# Patient Record
Sex: FEMALE | Race: WHITE | NOT HISPANIC OR LATINO | URBAN - METROPOLITAN AREA
[De-identification: names, ages, dates, MRNs, and addresses within clinical notes are randomized per-mention and may not be internally consistent; named-entity substitution may affect disease eponyms.]

---

## 2022-04-19 ENCOUNTER — OFFICE VISIT (OUTPATIENT)
Dept: URGENT CARE | Facility: CLINIC | Age: 22
End: 2022-04-19
Payer: COMMERCIAL

## 2022-04-19 VITALS — RESPIRATION RATE: 14 BRPM | WEIGHT: 186 LBS | HEART RATE: 67 BPM | OXYGEN SATURATION: 98 % | TEMPERATURE: 97.5 F

## 2022-04-19 DIAGNOSIS — R11.11 VOMITING WITHOUT NAUSEA, UNSPECIFIED VOMITING TYPE: Primary | ICD-10-CM

## 2022-04-19 PROCEDURE — 99213 OFFICE O/P EST LOW 20 MIN: CPT | Performed by: PHYSICIAN ASSISTANT

## 2022-04-19 RX ORDER — MONTELUKAST SODIUM 10 MG/1
10 TABLET ORAL
COMMUNITY

## 2022-04-19 RX ORDER — NORGESTIMATE AND ETHINYL ESTRADIOL 0.25-0.035
1 KIT ORAL DAILY
COMMUNITY

## 2022-04-19 NOTE — PROGRESS NOTES
[facility]        NAME: Dano England is a 23 y o  female  : 2000    MRN: [id number]  DATE: 2022  TIME: 12:03 PM    Assessment and Plan   Vomiting without nausea, unspecified vomiting type [R11 11]  1  Vomiting without nausea, unspecified vomiting type       Patient Instructions   Vomiting likely from food poisoning  Keep hydrating as much as possible  Bowel rest  When starts solids, small portions of bland food    Follow up with PCP in 3-5 days  Proceed to  ER if symptoms worsen  Chief Complaint     Chief Complaint   Patient presents with    Vomiting     pt presents with vomiting x 5 started yesterday; today x 3; no appetite; abd  cramping discomfort that radiates around the upper mid back prior to vomiting          History of Present Illness       Soila Garcia is a 14-year-old female who presents to clinic complaining of vomiting x2 days  She states that it started yesterday morning she vomited 5 times yesterday and 3 times a day  She denies any nausea but no abdominal cramping right before vomiting  She states after she vomits the abdominal pain is gone  She also has occasional soft diarrhea and watery  She denies any fever, chills, new foods, or new medications  She denies any nasal congestion, cough, rhinorrhea  Review of Systems   Review of Systems   Constitutional: Negative for chills, fatigue and fever  HENT: Negative for congestion, postnasal drip and rhinorrhea  Gastrointestinal: Positive for abdominal pain, diarrhea and vomiting  Negative for nausea           Current Medications       Current Outpatient Medications:     montelukast (SINGULAIR) 10 mg tablet, Take 10 mg by mouth daily at bedtime, Disp: , Rfl:     norgestimate-ethinyl estradiol (Rocio) 0 25-35 MG-MCG per tablet, Take 1 tablet by mouth daily, Disp: , Rfl:     Current Allergies     Allergies as of 2022    (No Known Allergies)            The following portions of the patient's history were reviewed and updated as appropriate: allergies, current medications, past family history, past medical history, past social history, past surgical history and problem list      Past Medical History:   Diagnosis Date    Asthma     Dermographism        Past Surgical History:   Procedure Laterality Date    NO PAST SURGERIES         History reviewed  No pertinent family history  Medications have been verified  Objective   Pulse 67   Temp 97 5 °F (36 4 °C)   Resp 14   Wt 84 4 kg (186 lb)   LMP 04/14/2022   SpO2 98%   Patient's last menstrual period was 04/14/2022  Physical Exam     Physical Exam  Vitals and nursing note reviewed  Constitutional:       General: She is not in acute distress  Appearance: Normal appearance  She is not ill-appearing  Cardiovascular:      Rate and Rhythm: Normal rate and regular rhythm  Heart sounds: Normal heart sounds  Pulmonary:      Effort: Pulmonary effort is normal       Breath sounds: Normal breath sounds  Abdominal:      General: Bowel sounds are normal  There is no distension  Palpations: Abdomen is soft  Tenderness: There is no abdominal tenderness  There is no right CVA tenderness, left CVA tenderness, guarding or rebound  Neurological:      Mental Status: She is alert and oriented to person, place, and time     Psychiatric:         Mood and Affect: Mood normal          Behavior: Behavior normal

## 2024-01-10 ENCOUNTER — OFFICE VISIT (OUTPATIENT)
Dept: FAMILY MEDICINE CLINIC | Facility: CLINIC | Age: 24
End: 2024-01-10
Payer: COMMERCIAL

## 2024-01-10 VITALS
OXYGEN SATURATION: 98 % | TEMPERATURE: 97.8 F | BODY MASS INDEX: 41.59 KG/M2 | SYSTOLIC BLOOD PRESSURE: 124 MMHG | WEIGHT: 226 LBS | HEIGHT: 62 IN | DIASTOLIC BLOOD PRESSURE: 82 MMHG | HEART RATE: 89 BPM | RESPIRATION RATE: 16 BRPM

## 2024-01-10 DIAGNOSIS — Z13.220 SCREENING FOR LIPID DISORDERS: ICD-10-CM

## 2024-01-10 DIAGNOSIS — Z13.0 SCREENING FOR DEFICIENCY ANEMIA: ICD-10-CM

## 2024-01-10 DIAGNOSIS — Z00.00 ANNUAL PHYSICAL EXAM: Primary | ICD-10-CM

## 2024-01-10 DIAGNOSIS — Z13.1 SCREENING FOR DIABETES MELLITUS: ICD-10-CM

## 2024-01-10 DIAGNOSIS — R73.09 ABNORMAL GLUCOSE: ICD-10-CM

## 2024-01-10 DIAGNOSIS — Z13.29 SCREENING FOR THYROID DISORDER: ICD-10-CM

## 2024-01-10 DIAGNOSIS — J45.20 MILD INTERMITTENT ASTHMA WITHOUT COMPLICATION: ICD-10-CM

## 2024-01-10 PROBLEM — E66.01 MORBID OBESITY (HCC): Status: ACTIVE | Noted: 2024-01-10

## 2024-01-10 LAB
SL AMB  POCT GLUCOSE, UA: NORMAL
SL AMB LEUKOCYTE ESTERASE,UA: 25
SL AMB POCT BILIRUBIN,UA: ABNORMAL
SL AMB POCT BLOOD,UA: ABNORMAL
SL AMB POCT CLARITY,UA: CLEAR
SL AMB POCT COLOR,UA: YELLOW
SL AMB POCT KETONES,UA: ABNORMAL
SL AMB POCT NITRITE,UA: ABNORMAL
SL AMB POCT PH,UA: 6
SL AMB POCT SPECIFIC GRAVITY,UA: 1.02
SL AMB POCT URINE PROTEIN: ABNORMAL
SL AMB POCT UROBILINOGEN: ABNORMAL

## 2024-01-10 PROCEDURE — 81003 URINALYSIS AUTO W/O SCOPE: CPT | Performed by: FAMILY MEDICINE

## 2024-01-10 PROCEDURE — 99385 PREV VISIT NEW AGE 18-39: CPT | Performed by: FAMILY MEDICINE

## 2024-01-10 RX ORDER — ALBUTEROL SULFATE 90 UG/1
AEROSOL, METERED RESPIRATORY (INHALATION)
COMMUNITY

## 2024-01-10 RX ORDER — MONTELUKAST SODIUM 10 MG/1
10 TABLET ORAL
Qty: 90 TABLET | Refills: 3 | Status: SHIPPED | OUTPATIENT
Start: 2024-01-10

## 2024-01-10 NOTE — PROGRESS NOTES
[facility] HEALTH MAINTENANCE OFFICE VISIT  [facility]    NAME: Trice Wong  AGE: 23 y.o. SEX: female  : 2000     DATE: 2024    Assessment and Plan     1. Annual physical exam  -     POCT urine dip auto non-scope  -     CBC; Future  -     Lipase; Future  -     Lipid Panel with Direct LDL reflex; Future  -     Comprehensive metabolic panel; Future  -     TSH, 3rd generation; Future  -     Hemoglobin A1C; Future    2. Mild intermittent asthma without complication  -     montelukast (SINGULAIR) 10 mg tablet; Take 1 tablet (10 mg total) by mouth daily at bedtime    3. Screening for thyroid disorder  -     TSH, 3rd generation; Future    4. Screening for deficiency anemia  -     CBC; Future    5. Screening for lipid disorders  -     Lipase; Future  -     Lipid Panel with Direct LDL reflex; Future  -     Comprehensive metabolic panel; Future    6. Abnormal glucose  -     Comprehensive metabolic panel; Future  -     Hemoglobin A1C; Future    7. Screening for diabetes mellitus  -     Hemoglobin A1C; Future    8. BMI 40.0-44.9, adult (HCC)        Patient Counseling:   Nutrition: Stressed importance of a well balanced diet, moderation of sodium/saturated fat, caloric balance and sufficient intake of fiber  Exercise: Stressed the importance of regular exercise with a goal of 150 minutes per week  Dental Health: Discussed daily flossing and brushing and regular dental visits   Sexuality: Discussed sexually transmitted infections, use of condoms and prevention of unintended pregnancy  Alcohol Use:  Recommended moderation of alcohol intake  Injury Prevention: Discussed Safety Belts, Safety Helmets, and Smoke Detectors    Immunizations reviewed: Risks and Benefits discussed  Discussed benefits of:  Cervical Cancer screening and Screening labs.  BMI Counseling: Body mass index is 41.34 kg/m². Discussed with patient's BMI with her. The BMI is above normal. Nutrition  recommendations include 3-5 servings of fruits/vegetables daily, moderation in carbohydrate intake, increasing intake of lean protein, reducing intake of saturated fat and trans fat, and reducing intake of cholesterol. Exercise recommendations include exercising 3-5 times per week and strength training exercises.          Chief Complaint     Chief Complaint   Patient presents with   • Physical Exam     Patient is trying to have a baby and her gyn said she needs to loose weight and would like to discuss ozempic       History of Present Illness     HPI    Well Adult Physical   Patient here for a comprehensive physical exam.      Diet and Physical Activity  Diet: well balanced diet  Exercise: intermittently      Depression Screen  PHQ-2/9 Depression Screening    Little interest or pleasure in doing things: 0 - not at all  Feeling down, depressed, or hopeless: 1 - several days  PHQ-2 Score: 1  PHQ-2 Interpretation: Negative depression screen          General Health  Hearing: Normal:  bilateral  Vision: due for eye exam  Dental:  due for dental exam    Reproductive Health  Has gyn--specialist recommended pt lose weight prior to trying to conceive      The following portions of the patient's history were reviewed and updated as appropriate: allergies, current medications, past family history, past medical history, past social history, past surgical history and problem list.    Review of Systems     Review of Systems   Constitutional:  Positive for fatigue. Negative for fever.   Respiratory: Negative.     Cardiovascular: Negative.    Gastrointestinal: Negative.    Allergic/Immunologic: Positive for environmental allergies.   Neurological: Negative.        Past Medical History     Past Medical History:   Diagnosis Date   • Asthma    • Dermographism        Past Surgical History     Past Surgical History:   Procedure Laterality Date   • NO PAST SURGERIES         Social History     Social History     Socioeconomic History   •  "Marital status: Single     Spouse name: None   • Number of children: None   • Years of education: None   • Highest education level: None   Occupational History   • None   Tobacco Use   • Smoking status: Never   • Smokeless tobacco: Never   Vaping Use   • Vaping status: Never Used   Substance and Sexual Activity   • Alcohol use: Never   • Drug use: Never   • Sexual activity: Yes     Partners: Male     Birth control/protection: Other   Other Topics Concern   • None   Social History Narrative   • None     Social Determinants of Health     Financial Resource Strain: Not on file   Food Insecurity: Not on file   Transportation Needs: Not on file   Physical Activity: Not on file   Stress: Not on file   Social Connections: Not on file   Intimate Partner Violence: Not on file   Housing Stability: Not on file       Family History     Family History   Problem Relation Age of Onset   • Diabetes Father    • No Known Problems Brother        Current Medications       Current Outpatient Medications:   •  albuterol (ProAir HFA) 90 mcg/act inhaler, ProAir HFA, Disp: , Rfl:   •  montelukast (SINGULAIR) 10 mg tablet, Take 1 tablet (10 mg total) by mouth daily at bedtime, Disp: 90 tablet, Rfl: 3  •  Prenatal MV-Min-Fe Fum-FA-DHA (PRENATAL 1 PO), Prenatal, Disp: , Rfl:   •  tirzepatide (Zepbound) 2.5 mg/0.5 mL auto-injector, Inject 0.5 mL (2.5 mg total) under the skin once a week for 28 days, Disp: 2 mL, Rfl: 0     Allergies     Allergies   Allergen Reactions   • Dog Epithelium Wheezing   • Seasonal Ic [Octacosanol] Wheezing       There is no immunization history on file for this patient.      Objective     /82 (BP Location: Left arm, Patient Position: Sitting, Cuff Size: Large)   Pulse 89   Temp 97.8 °F (36.6 °C)   Resp 16   Ht 5' 2\" (1.575 m)   Wt 103 kg (226 lb)   LMP 01/01/2024   SpO2 98%   BMI 41.34 kg/m²      Physical Exam  Vitals and nursing note reviewed.   Constitutional:       General: She is not in acute " distress.     Appearance: She is obese.   Eyes:      General: No scleral icterus.     Conjunctiva/sclera: Conjunctivae normal.   Cardiovascular:      Rate and Rhythm: Normal rate and regular rhythm.   Pulmonary:      Effort: Pulmonary effort is normal. No respiratory distress.      Breath sounds: Normal breath sounds.   Abdominal:      General: Bowel sounds are normal.      Palpations: Abdomen is soft.      Tenderness: There is no abdominal tenderness.   Musculoskeletal:         General: Normal range of motion.      Cervical back: Neck supple. No tenderness.   Skin:     General: Skin is warm and dry.      Coloration: Skin is not jaundiced.   Neurological:      General: No focal deficit present.      Mental Status: She is alert and oriented to person, place, and time.      Cranial Nerves: No cranial nerve deficit.   Psychiatric:         Mood and Affect: Mood normal.         Vision Screening    Right eye Left eye Both eyes   Without correction      With correction 20/50 20/30 20/30       Sun Carpenter MD  [facility]

## 2024-01-11 ENCOUNTER — TELEPHONE (OUTPATIENT)
Dept: ADMINISTRATIVE | Facility: OTHER | Age: 24
End: 2024-01-11

## 2024-01-11 LAB
ALBUMIN SERPL-MCNC: 4.3 G/DL (ref 4–5)
ALBUMIN/GLOB SERPL: 1.7 {RATIO} (ref 1.2–2.2)
ALP SERPL-CCNC: 65 IU/L (ref 44–121)
ALT SERPL-CCNC: 32 IU/L (ref 0–32)
AST SERPL-CCNC: 23 IU/L (ref 0–40)
BASOPHILS # BLD AUTO: 0 X10E3/UL (ref 0–0.2)
BASOPHILS NFR BLD AUTO: 1 %
BILIRUB SERPL-MCNC: 0.3 MG/DL (ref 0–1.2)
BUN SERPL-MCNC: 11 MG/DL (ref 6–20)
BUN/CREAT SERPL: 16 (ref 9–23)
CALCIUM SERPL-MCNC: 9.4 MG/DL (ref 8.7–10.2)
CHLORIDE SERPL-SCNC: 105 MMOL/L (ref 96–106)
CHOLEST SERPL-MCNC: 161 MG/DL (ref 100–199)
CO2 SERPL-SCNC: 22 MMOL/L (ref 20–29)
CREAT SERPL-MCNC: 0.68 MG/DL (ref 0.57–1)
EGFR: 127 ML/MIN/1.73
EOSINOPHIL # BLD AUTO: 0.1 X10E3/UL (ref 0–0.4)
EOSINOPHIL NFR BLD AUTO: 2 %
ERYTHROCYTE [DISTWIDTH] IN BLOOD BY AUTOMATED COUNT: 12.4 % (ref 11.7–15.4)
GLOBULIN SER-MCNC: 2.6 G/DL (ref 1.5–4.5)
GLUCOSE SERPL-MCNC: 97 MG/DL (ref 70–99)
HBA1C MFR BLD: 5.5 % (ref 4.8–5.6)
HCT VFR BLD AUTO: 38.9 % (ref 34–46.6)
HDLC SERPL-MCNC: 35 MG/DL
HGB BLD-MCNC: 13.1 G/DL (ref 11.1–15.9)
IMM GRANULOCYTES # BLD: 0 X10E3/UL (ref 0–0.1)
IMM GRANULOCYTES NFR BLD: 1 %
LDLC SERPL CALC-MCNC: 90 MG/DL (ref 0–99)
LIPASE SERPL-CCNC: 31 U/L (ref 14–72)
LYMPHOCYTES # BLD AUTO: 2.7 X10E3/UL (ref 0.7–3.1)
LYMPHOCYTES NFR BLD AUTO: 33 %
MCH RBC QN AUTO: 28.9 PG (ref 26.6–33)
MCHC RBC AUTO-ENTMCNC: 33.7 G/DL (ref 31.5–35.7)
MCV RBC AUTO: 86 FL (ref 79–97)
MICRODELETION SYND BLD/T FISH: NORMAL
MONOCYTES # BLD AUTO: 0.7 X10E3/UL (ref 0.1–0.9)
MONOCYTES NFR BLD AUTO: 8 %
NEUTROPHILS # BLD AUTO: 4.6 X10E3/UL (ref 1.4–7)
NEUTROPHILS NFR BLD AUTO: 55 %
PLATELET # BLD AUTO: 396 X10E3/UL (ref 150–450)
POTASSIUM SERPL-SCNC: 4.5 MMOL/L (ref 3.5–5.2)
PROT SERPL-MCNC: 6.9 G/DL (ref 6–8.5)
RBC # BLD AUTO: 4.53 X10E6/UL (ref 3.77–5.28)
SODIUM SERPL-SCNC: 142 MMOL/L (ref 134–144)
TRIGL SERPL-MCNC: 212 MG/DL (ref 0–149)
TSH SERPL DL<=0.005 MIU/L-ACNC: 1.19 UIU/ML (ref 0.45–4.5)
WBC # BLD AUTO: 8.2 X10E3/UL (ref 3.4–10.8)

## 2024-01-11 NOTE — TELEPHONE ENCOUNTER
Upon review of the In Basket request and the patient's chart, initial outreach has been made via fax to facility. Please see Contacts section for details.     Thank you  Anju Winston MA

## 2024-01-11 NOTE — LETTER
2nd Request          Procedure Request Form: Cervical Cancer Screening      Date Requested: 01/15/24  Patient: Trice Wong  Patient : 2000   Referring Provider: Sun Carpenter MD        Date of Procedure ______________________________       The above patient has informed us that they have completed their   most recent Cervical Cancer Screening at your facility. Please complete   this form and attach all corresponding procedure reports/results.    Comments __________________________________________________________  ____________________________________________________________________  ____________________________________________________________________  ____________________________________________________________________    Facility Completing Procedure _________________________________________    Form Completed By (print name) _______________________________________      Signature __________________________________________________________      These reports are needed for  compliance.    Please fax this completed form and a copy of the procedure report to our office located at [address] [place] [address] as soon as possible to Fax [phone] attention Anju: Phone [phone]    We thank you for your assistance in treating our mutual patient.

## 2024-01-11 NOTE — LETTER
Procedure Request Form: Cervical Cancer Screening      Date Requested: 24  Patient: Trice Wong  Patient : 2000   Referring Provider: Sun Carpenter MD        Date of Procedure ______________________________       The above patient has informed us that they have completed their   most recent Cervical Cancer Screening at your facility. Please complete   this form and attach all corresponding procedure reports/results.    Comments __________________________________________________________  ____________________________________________________________________  ____________________________________________________________________  ____________________________________________________________________    Facility Completing Procedure _________________________________________    Form Completed By (print name) _______________________________________      Signature __________________________________________________________      These reports are needed for  compliance.    Please fax this completed form and a copy of the procedure report to our office located at [address] [place] [address] as soon as possible to Fax [phone] attention Anju: Phone [phone]    We thank you for your assistance in treating our mutual patient.

## 2024-01-12 ENCOUNTER — TELEPHONE (OUTPATIENT)
Dept: FAMILY MEDICINE CLINIC | Facility: CLINIC | Age: 24
End: 2024-01-12

## 2024-01-12 NOTE — TELEPHONE ENCOUNTER
Patient wants to know if you were able to determine from her lab results which weight loss medication you will send to [facility]. Please advise.

## 2024-01-15 NOTE — TELEPHONE ENCOUNTER
As a follow-up, a second attempt has been made for outreach via fax to facility. Please see Contacts section for details.    Thank you  Anju Winston MA

## 2024-01-16 ENCOUNTER — TELEPHONE (OUTPATIENT)
Age: 24
End: 2024-01-16

## 2024-01-16 DIAGNOSIS — E66.01 MORBID OBESITY (HCC): Primary | ICD-10-CM

## 2024-01-16 RX ORDER — TIRZEPATIDE 2.5 MG/.5ML
2.5 INJECTION, SOLUTION SUBCUTANEOUS WEEKLY
Qty: 2 ML | Refills: 0 | Status: SHIPPED | OUTPATIENT
Start: 2024-01-16 | End: 2024-02-13

## 2024-01-16 NOTE — TELEPHONE ENCOUNTER
Prior Auth needed to initiate prior authorization    Medication  tirzepatide (Zepbound) 2.5 mg/0.5 mL auto-injector Dose: 2.5 mg Route: Subcutaneous Frequency: Weekly  Dispense Quantity: 2 mL Sig: Inject 0.5 mL (2.5 mg total) under the skin once a week for 28 days

## 2024-01-16 NOTE — TELEPHONE ENCOUNTER
As a final attempt, a third outreach has been made via telephone call to facility. Please see Contacts section for details. This encounter will be closed and completed by end of day. Should we receive the requested information because of previous outreach attempts, the requested patient's chart will be updated appropriately.     Thank you  Anju Winston MA

## 2024-01-16 NOTE — TELEPHONE ENCOUNTER
Patient advised of results and the zepbound was not covered , patient is suppose to call back with the alternatives for this . DULCE

## 2024-01-16 NOTE — TELEPHONE ENCOUNTER
PA for Zepbound    Submitted via  []CMM-KEY    [x][employer]-Case ID # [id number]   []Faxed to plan   []Other website    []Phone call Case ID #      Office notes sent, clinical questions answered. Awaiting determination

## 2024-01-16 NOTE — TELEPHONE ENCOUNTER
Please inform labs show elevated triglycerides and low HDL--remainder within range.  She is not diabetic.  I will send over the Zepbound--it is a weekly injection--will not know cost until sent to pharmacy-  Ask that she call back if any further issue -otherwise sched appt in one month for follow-up---thanks

## 2024-01-17 NOTE — TELEPHONE ENCOUNTER
Upon review of the In Basket request we were able to locate, review, and update the patient chart as requested for Pap Smear (HPV) aka Cervical Cancer Screening.    Any additional questions or concerns should be emailed to the Practice Liaisons via the appropriate education email address, please do not reply via In Basket.    Thank you  Anju Winston MA

## 2024-01-17 NOTE — TELEPHONE ENCOUNTER
Patient called to follow up on Zepbound medication. States was told by pharmacy would have to pay $1,000 out of pocket a month for medication there is no alternative. Advised patient a prior authorization has been started for Zepbound through insurance and will be called once approved.

## 2024-01-22 NOTE — TELEPHONE ENCOUNTER
PA for tirzepatide (Zepbound) 2.5 mg/0.5 mL auto-injector Denied    Reason:      Message sent to office clinical pool No Appeal Submitted    Denial letter scanned into Media Yes    Appeal started Yes

## 2024-01-22 NOTE — TELEPHONE ENCOUNTER
PA for tirzepatide (Zepbound) 2.5 mg/0.5 mL auto-injector  appealed via     []CMM  []SS  [x]Letter sent to insurance via fax [phone]  []Other site or means     All necessary records sent. Will await determination.

## 2024-01-29 ENCOUNTER — TELEPHONE (OUTPATIENT)
Age: 24
End: 2024-01-29

## 2024-01-29 NOTE — TELEPHONE ENCOUNTER
Patient states Zepbound costs over $1000 to be filled. She wants to speak with PCP regarding other medication options. Please follow up.

## 2024-01-29 NOTE — TELEPHONE ENCOUNTER
"Was the PA sent to the wrong company. The \"approval' letter stated Ana but her insurance is [employer].Zelda Olivera      "

## 2024-02-05 NOTE — TELEPHONE ENCOUNTER
Duplicate PA Request please see telephone encounter from 1- regarding Zepbound PA. Please review patient's chart to see status of PA before creating another encounter Thank You.

## 2024-02-06 NOTE — TELEPHONE ENCOUNTER
Please check with pt/pharmacy and/or insurance to see if approved--addtl info was sent and one of the follow-up messages said awaiting determination.--thanks  If not approved let pt know that I will try to send in alternative---Saxenda.

## 2024-02-07 NOTE — TELEPHONE ENCOUNTER
Called [facility] and spoke with Desirae who confirmed that zepbound was approved by insurance. She will now order medication and should have it ready for patient by tomorrow afternoon. Called patient and advised.

## 2024-02-08 ENCOUNTER — TELEPHONE (OUTPATIENT)
Dept: OTHER | Facility: OTHER | Age: 24
End: 2024-02-08

## 2024-02-08 NOTE — TELEPHONE ENCOUNTER
Patient called today regarding her medication   Zepbound 2.5 MG/0.5ML auto-injector was sent to wrong insurance Company for Prior Auth. Please send Prior Auth to [employer]. Please call patient back to discuss.

## 2024-02-09 ENCOUNTER — TELEPHONE (OUTPATIENT)
Age: 24
End: 2024-02-09

## 2024-02-09 NOTE — TELEPHONE ENCOUNTER
PA for tirzepatide (Zepbound) 2.5 mg/0.5 mL auto-injector     Submitted via  []CMM-KEY   [x][employer]-Case ID # [id number]  []Faxed to plan   []Other website   []Phone call Case ID #     Office notes sent, clinical questions answered. Awaiting determination

## 2024-02-12 NOTE — TELEPHONE ENCOUNTER
PA for tirzepatide (Zepbound) 2.5 mg/0.5 mL auto-injector   Denied    Reason:(Screenshot if applicable)        Message sent to office clinical pool Yes    Denial letter scanned into Media Yes    Appeal started No

## 2024-02-12 NOTE — TELEPHONE ENCOUNTER
Patient called in to follow up on denial for Zepbound on 2/9 telephone encounter and had questions from the insurer. Per patient's denial, insurer suggested formulary alternatives as Saxenda injection or Wegovy injection. Would PCP order either medication and send to pharmacy. If it requires prior auth, then the PA department will work with insurer. Please follow up with patient.

## 2024-02-12 NOTE — TELEPHONE ENCOUNTER
Pt called to check the status of her prior authorization for zepbound.  (Did review messages in chart, pt is aware of the denial, she stated she would like to try an alternative medication as required by insurance.  Pt would also like a call back from office with next steps).

## 2024-02-14 RX ORDER — LIRAGLUTIDE 6 MG/ML
INJECTION, SOLUTION SUBCUTANEOUS
Qty: 15 ML | Refills: 1 | Status: SHIPPED | OUTPATIENT
Start: 2024-02-14

## 2024-02-14 NOTE — TELEPHONE ENCOUNTER
Patient called to follow up on if provider has ordered an alternative medication (Saxenda or Wegovy )post denial for Zepbound. Please follow up with patient for provider response.

## 2024-02-15 ENCOUNTER — TELEPHONE (OUTPATIENT)
Age: 24
End: 2024-02-15

## 2024-02-15 NOTE — TELEPHONE ENCOUNTER
Pharmacist called stated that the medication Saxenda is on back order not sure when the medication will  be available,

## 2024-02-15 NOTE — TELEPHONE ENCOUNTER
BILLIE Allen    Submitted via  []CMM-KEY   [x][employer]-Case ID # [id number]  []Faxed to plan   []Other website   []Phone call Case ID #     Office notes sent, clinical questions answered. Awaiting determination

## 2024-02-15 NOTE — TELEPHONE ENCOUNTER
BILLIE Allen Approved     Date(s) approved 2- to 6-          Patient advised by [x] [place] Message                      [] Phone call       Pharmacy advised by [x]Fax                                     []Phone call    Approval letter scanned into Media No not available at this time

## 2024-02-19 NOTE — TELEPHONE ENCOUNTER
I spoke w/patient all pharmacies in area are out of stock of this medication.  Please send in substitute to [facility] in [place].

## 2024-02-19 NOTE — TELEPHONE ENCOUNTER
Patient called and stated she can not find the Saxenda at any pharmacy ,patient wanted to know what are the next steps.

## 2024-02-21 PROBLEM — Z13.1 SCREENING FOR DIABETES MELLITUS: Status: RESOLVED | Noted: 2024-01-10 | Resolved: 2024-02-21

## 2024-02-22 ENCOUNTER — TELEPHONE (OUTPATIENT)
Dept: OTHER | Facility: OTHER | Age: 24
End: 2024-02-22

## 2024-02-22 NOTE — TELEPHONE ENCOUNTER
Patient called in regards of alternative medication for Saxenda status. Patient asked Dr. Carpenter to call her to follow up.

## 2024-02-23 NOTE — TELEPHONE ENCOUNTER
Dr. Carpenter:    Please see previous message from 2/15.  I called [facility] and [facility] they are out of stock not sure when medication will be in stock.  Spoke with patient, she would like an alternative medication sent to [facility] in [place].

## 2024-02-29 ENCOUNTER — TELEPHONE (OUTPATIENT)
Dept: FAMILY MEDICINE CLINIC | Facility: CLINIC | Age: 24
End: 2024-02-29

## 2024-02-29 DIAGNOSIS — E66.1 CLASS 3 DRUG-INDUCED OBESITY WITH BODY MASS INDEX (BMI) OF 40.0 TO 44.9 IN ADULT, UNSPECIFIED WHETHER SERIOUS COMORBIDITY PRESENT (HCC): Primary | ICD-10-CM

## 2024-02-29 NOTE — TELEPHONE ENCOUNTER
----- Message from Sun Carpenter MD sent at 2/29/2024  1:13 PM EST -----  Regarding: FW: Weight loss medication   Contact: [phone]  I put in referral for [facility] program--please give pt number to call to schedule appt--in interim she can try OTC Sal--thanks  ----- Message -----  From: Ryleigh Nemec  Sent: 2/27/2024  11:26 AM EST  To: Sun Carpenter MD  Subject: FW: Weight loss medication                         ----- Message -----  From: Katie Díaz RN  Sent: 2/27/2024  11:11 AM EST  To: [facility]  Subject: FW: Weight loss medication                         ----- Message -----  From: Trice Wong  Sent: 2/27/2024  11:09 AM EST  To: [facility]  Subject: Weight loss medication                           Hello! I hope your day is going well! I just wanted to speak with you about the weight loss medication. We have been trying to figure this out for over a month now and I wanted to know what our next step would be since my insurance won’t cover zepbound and Saxenda is on back order everywhere.

## 2024-03-11 ENCOUNTER — OFFICE VISIT (OUTPATIENT)
Dept: BARIATRICS | Facility: CLINIC | Age: 24
End: 2024-03-11
Payer: COMMERCIAL

## 2024-03-11 VITALS
WEIGHT: 227.4 LBS | SYSTOLIC BLOOD PRESSURE: 126 MMHG | HEART RATE: 77 BPM | DIASTOLIC BLOOD PRESSURE: 80 MMHG | HEIGHT: 62 IN | BODY MASS INDEX: 41.85 KG/M2 | RESPIRATION RATE: 16 BRPM

## 2024-03-11 DIAGNOSIS — E66.1 CLASS 3 DRUG-INDUCED OBESITY WITH BODY MASS INDEX (BMI) OF 40.0 TO 44.9 IN ADULT, UNSPECIFIED WHETHER SERIOUS COMORBIDITY PRESENT (HCC): ICD-10-CM

## 2024-03-11 PROBLEM — E66.813 CLASS 3 DRUG-INDUCED OBESITY WITH BODY MASS INDEX (BMI) OF 40.0 TO 44.9 IN ADULT (HCC): Status: ACTIVE | Noted: 2024-03-11

## 2024-03-11 PROCEDURE — 99204 OFFICE O/P NEW MOD 45 MIN: CPT | Performed by: INTERNAL MEDICINE

## 2024-03-11 NOTE — ASSESSMENT & PLAN NOTE
-Discussed plan with patient as below.  Offered emotional support to patient's frustration about inability to get pregnant.  Did  the patient that if we undertake lifestyle measures such as a calorie deficit diet that could induce weight loss, she would have to switch to a healthy eating Plan if she does get pregnant( as she is actively trying to become pregnant with her partner)  -Patient has tried to obtain injectable medications in the past.  Did discuss with patient that all antiobesity pharmacotherapy is contraindicated in pregnancy.  Given patient's active attempts to get pregnant, if we do attempt antiobesity medications, she would need to sign medication consent and use 2 forms of contraception.  Patient understands and reported that she would like to discuss the plan with her partner  -Also reviewed her over eating as patient expressed concern that she is unsure if she has an eating disorder.  Patient does report eating to the point of feeling overfull but has no compensatory behaviors.  Did suggest that patient would benefit from meeting with behavioral health specialist to investigate this.  She feels like she would be able to comply with any prescribed diet given her long-term goals despite the urge she describes.   - patient reports that she is unsure whether she would be helped by therapy-however did encourage patient to keep an open mind, so that she could investigate her urge to eat and little uncomfortably full and also to develop coping strategies surrounding emotional struggle.  Did discuss with patient the importance of self-care and not putting undue pressure on herself, which may be detrimental to her ultimate goal of conception.  -Patient request that due to her work schedule she would like to have visits virtually with the dietitian and behaviorist  -Patient reports she has a membership to a gym which she is able to resume.  Recommended slowly ramping up aerobic activity to 150-200  mins per week, along with 2-3 days of resistance training   -Patient reports irregular.  But unclear if she has PCOS and if metformin would be a consideration.  However given her weight loss goals patient would likely need an injectable medicine.  Also discussed the option of bariatric surgery with the patient and provided her the flyer with the options.  Patient would like to discuss all options with her partner would likely want to start with lifestyle modifications first

## 2024-03-11 NOTE — PROGRESS NOTES
Assessment/Plan     Trice Wong is 24 y.o. year old female  who comes in for consultation for assistance with weight management.     - Discussed options of [facility]-Intensive Lifestyle Intervention Program, Very Low Calorie Diet-VLCD, and Conservative Program and the role of weight loss medications.  - Patient is interested in pursuing Conservative Program    Class 3 drug-induced obesity with body mass index (BMI) of 40.0 to 44.9 in adult (HCC)  -Discussed plan with patient as below.  Offered emotional support to patient's frustration about inability to get pregnant.  Did  the patient that if we undertake lifestyle measures such as a calorie deficit diet that could induce weight loss, she would have to switch to a healthy eating Plan if she does get pregnant( as she is actively trying to become pregnant with her partner)  -Patient has tried to obtain injectable medications in the past.  Did discuss with patient that all antiobesity pharmacotherapy is contraindicated in pregnancy.  Given patient's active attempts to get pregnant, if we do attempt antiobesity medications, she would need to sign medication consent and use 2 forms of contraception.  Patient understands and reported that she would like to discuss the plan with her partner  -Also reviewed her over eating as patient expressed concern that she is unsure if she has an eating disorder.  Patient does report eating to the point of feeling overfull but has no compensatory behaviors.  Did suggest that patient would benefit from meeting with behavioral health specialist to investigate this.  She feels like she would be able to comply with any prescribed diet given her long-term goals despite the urge she describes.   - patient reports that she is unsure whether she would be helped by therapy-however did encourage patient to keep an open mind, so that she could investigate her urge to eat and little uncomfortably full and also to develop coping  strategies surrounding emotional struggle.  Did discuss with patient the importance of self-care and not putting undue pressure on herself, which may be detrimental to her ultimate goal of conception.  -Patient request that due to her work schedule she would like to have visits virtually with the dietitian and behaviorist  -Patient reports she has a membership to a gym which she is able to resume.  Recommended slowly ramping up aerobic activity to 150-200 mins per week, along with 2-3 days of resistance training   -Patient reports irregular.  But unclear if she has PCOS and if metformin would be a consideration.  However given her weight loss goals patient would likely need an injectable medicine.  Also discussed the option of bariatric surgery with the patient and provided her the flyer with the options.  Patient would like to discuss all options with her partner would likely want to start with lifestyle modifications first    Trice was seen today for consult.    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          Return visit:  3 months         General Lifestyle recommendations:    Nutrition   -Avoid skipping meals. Avoid sugary beverages. At least 64oz of water daily.  Limit processed food, refined sugars and grain. Encourage  healthy choices for meals and snacks   -Focus on protein goals and non starchy fiber rich vegetables for satiety effect and to help support a calorie deficit.   - Emphasize portion control, well balanced macronutrient's (protein, carbohydrate, fat using MyPlate method )and adequate protein with each meal/snacks and distributing calories equally throughout the day along with.   -Advise starting the day with a protein breakfast  "  Behavioral/Stress   Food log via radha or provided paper log (radha options include www.Browsercast.com.com, sparkpeople.com, loseit.com, calorieking.com, [employer]). Encouraged mindful eating. Be sure to set aside time to eat, eat slowly, and savor your food. Consider meditation apps and/or taking a few minutes of mindfulness every AM. Understand the role of regarding the role of stress hormone cortisol in promoting weight gain and visceral fat accumulation. Weigh daily or atleast 2-3 times/ week  Physical Activity   Increase physical activity by 10 minutes daily. Gradually increase physical activity to a goal of 5 days per week for 30 minutes of MODERATE intensity ( should be able to pass the \"talk test\" but should not be able to sing. Target 150-300 minutes  PLUS 2 days per week of FULL BODY resistance training. Progression will be addressed at follow up visits. Encouraged contemplation regarding establishing a daily physical activity routine  - Resistance training along with increase protein intake is important to maintain and enhance metabolism  Sleep   Encourage sleep hygiene and importance of having adequate sleep duration at least > 6 hours to support response in weight loss efforts    Handouts provided :  [facility]  MyPlate and food quality  Food log resources, phone radha or paper journal  Antiobesity medications options     - Discussed at length and the role of weight loss medications and medication options   - Explained the importance of making lifestyle changes in addition to starting any anti-obesity medications if the  patient chooses.  -  Initial weight loss goal of 5-10% weight loss for improved health  - Weight loss can improve patient's co-morbid conditions and/or prevent weight-related complications.  - Weight is not at goal and patient has been unable to achieve a meaningful weight loss above 5% using various programs and tools for more than 6 months    Trice was seen today " for consult.    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 Discussed in detail nonsurgical options including intensive lifestyle intervention program, very low-calorie diet program and conservative program.  Discussed the role of weight loss medications.  Counseled patient on diet behavior and exercise modification for weight loss.            Lifestyle questionnaire       Diet recall:  B: depends, 10:15 AM Ham and cheese sandwich with doritos,   L: skips  D: breaded chicken with rice and beans  S: popcorn, sometimes eats late into the night  Eating out vs cooking at home- 2-3 x/ week    Beverages  Water--  16x3   Caffeine/tea--  none   SSB- regular gatorde 2x/ day, no sodas    Alcohol: rarely  Smoking: marijunana 2-3 x/ week  Drug use: marijuana    Physical Activity --occasionally walking   Sleep -- STOP- BANG-     Occupation-[employer] for [employer] program  Psycho social- BF and her        Weight history     Start date: 26  Current weight : 227 lbs  Current BMI: 41.59 kg/m2  Obesity Class: Above 40- Obesity Class III  Goal weight: 170-180 lbs    Onset--at age 22  Food behaviorsstress/emotional eating, boredom eating, and struggle controlling portions; Has multiple Food preferences ; patient does report that she wants a snack even after dinner, even after she is full-she will eat to the point of feeling uncomfortably full   Gyneac (Menopausal status/periods/contraception)- irregular   Fam hx of obesity- mom  Weight loss medications tried: Could not obtain  Saxlarisa Zepbound not covered by patient's insurance, has not explored the option of Wegovy    Patient reports and other history-  Trying to get pregnant; was told by OB/GYN that she had infertility and was recommended to get prescription for injectable from primary care  - tried Keto  -Patient reports that she is trying to get pregnant with her boyfriend and expressed a lot of sadness and frustration that she is unable to do so due to her weight.  She was encouraged by her OB to seek specialist opinion regarding the cause of infertility and full evaluation of patient and partner  Wt Readings from Last 20 Encounters:   03/11/24 103 kg (227 lb 6.4 oz)   01/10/24 103 kg (226 lb)   04/19/22 84.4 kg (186 lb)           Medication considerations/contraindications     -Patient denies personal history of pancreatitis. Patient also denies personal and family history of medullary thyroid cancer and multiple endocrine neoplasia type 2 (MEN 2 tumor). -Patient denies any history of kidney stones, seizures, or glaucoma, diabetic retinopathy, gall bladder disease, hyperthyroidism.  -Denies Hx of CAD, PAD, palpitations, arrhythmia.   -Denies uncontrolled anxiety or depression, suicidal behavior or thinking , insomnia or sleep disturbance.         Past medical history/past surgical history       Previous notes and records have been reviewed.    The following portions of the patient's history were reviewed and updated as appropriate: allergies, current medications, past family history, past medical history, past social history, past surgical history, and problem list.    Past Medical History:   Diagnosis Date    Asthma     Dermographism          Past Surgical History:   Procedure Laterality Date    NO PAST SURGERIES               Family History   Problem Relation Age of Onset    Diabetes Father     No Known Problems Brother             Objective     /80 (BP Location: Left arm, Patient Position: Sitting, Cuff Size: Large)    "Pulse 77   Resp 16   Ht 5' 2\" (1.575 m)   Wt 103 kg (227 lb 6.4 oz)   BMI 41.59 kg/m²       Review of Systems   Constitutional:  Negative for fatigue.   HENT:  Negative for sore throat.    Respiratory:  Negative for cough and shortness of breath.    Cardiovascular:  Negative for chest pain, palpitations and leg swelling.   Gastrointestinal:  Negative for abdominal pain, constipation, diarrhea and nausea.   Genitourinary:  Negative for dysuria.   Musculoskeletal:  Negative for arthralgias and back pain.   Skin:  Negative for rash.   Neurological:  Negative for headaches.   Psychiatric/Behavioral:  Negative for dysphoric mood. The patient is not nervous/anxious.        Physical Exam  Vitals and nursing note reviewed.   Constitutional:       Appearance: Normal appearance.   HENT:      Head: Normocephalic.   Neck:      Thyroid: No thyroid mass, thyromegaly or thyroid tenderness.   Cardiovascular:      Rate and Rhythm: Normal rate and regular rhythm.      Pulses: Normal pulses.      Heart sounds: Normal heart sounds.   Pulmonary:      Effort: Pulmonary effort is normal.      Breath sounds: Normal breath sounds.   Abdominal:      General: Bowel sounds are normal.      Palpations: Abdomen is soft.   Musculoskeletal:      Cervical back: Normal range of motion and neck supple. No tenderness.      Right lower leg: No edema.      Left lower leg: No edema.   Skin:     General: Skin is warm and dry.   Neurological:      General: No focal deficit present.      Mental Status: She is alert and oriented to person, place, and time.   Psychiatric:         Mood and Affect: Mood normal.         Behavior: Behavior normal.         Thought Content: Thought content normal.         Judgment: Judgment normal.            Medications       Current Outpatient Medications:     albuterol (ProAir HFA) 90 mcg/act inhaler, ProAir HFA, Disp: , Rfl:     liraglutide (Saxenda) injection, Start 0.6mg SC daily for 1 week, then increase dose by 0.6 " mg/day every week to target of 3 mg daily, Disp: 15 mL, Rfl: 1    montelukast (SINGULAIR) 10 mg tablet, Take 1 tablet (10 mg total) by mouth daily at bedtime, Disp: 90 tablet, Rfl: 3    Prenatal MV-Min-Fe Fum-FA-DHA (PRENATAL 1 PO), Prenatal, Disp: , Rfl:     liraglutide (Saxenda) injection, Start 0.6mg SC daily for 1 week, then increase dose by 0.6 mg/day every week to target of 3 mg daily, Disp: 15 mL, Rfl: 1           Labs and imaging     Recent labs and imaging have been personally reviewed.  Lab Results   Component Value Date    WBC 8.2 01/10/2024    HGB 13.1 01/10/2024    HCT 38.9 01/10/2024    MCV 86 01/10/2024     01/10/2024     Lab Results   Component Value Date    SODIUM 142 01/10/2024    K 4.5 01/10/2024     01/10/2024    CO2 22 01/10/2024    BUN 11 01/10/2024    CREATININE 0.68 01/10/2024    GLUC 97 01/10/2024    AST 23 01/10/2024    ALT 32 01/10/2024    TP 6.9 01/10/2024    TBILI 0.3 01/10/2024    EGFR 127 01/10/2024     Lab Results   Component Value Date    HGBA1C 5.5 01/10/2024     Lab Results   Component Value Date    TSH 1.190 01/10/2024     Lab Results   Component Value Date    CHOLESTEROL 161 01/10/2024     Lab Results   Component Value Date    HDL 35 (L) 01/10/2024     Lab Results   Component Value Date    TRIG 212 (H) 01/10/2024     Lab Results   Component Value Date    LDLCALC 90 01/10/2024

## 2024-03-13 ENCOUNTER — TELEPHONE (OUTPATIENT)
Dept: BARIATRICS | Facility: CLINIC | Age: 24
End: 2024-03-13

## 2024-03-13 ENCOUNTER — TELEPHONE (OUTPATIENT)
Age: 24
End: 2024-03-13

## 2024-03-13 NOTE — TELEPHONE ENCOUNTER
Seen by  in the office on 3/11/24. Calling back as she would now like to set up her virtual office visits with the Dietitian and Behaviorist. She states when she was at the office she spoke to Domonique who was going to assist her in setting this up. She would like a call back to begin the process. Thank you.

## 2024-03-13 NOTE — TELEPHONE ENCOUNTER
03/13/2024 attempted to call patient back tos chedule the appts in virtual w/ RD and SW no answer Lmsg for patient to call me back when available.

## 2024-03-15 DIAGNOSIS — E66.01 OBESITY, CLASS III, BMI 40-49.9 (MORBID OBESITY) (HCC): Primary | ICD-10-CM

## 2024-03-25 ENCOUNTER — CLINICAL SUPPORT (OUTPATIENT)
Dept: BARIATRICS | Facility: CLINIC | Age: 24
End: 2024-03-25

## 2024-03-25 DIAGNOSIS — E66.01 OBESITY, CLASS III, BMI 40-49.9 (MORBID OBESITY) (HCC): Primary | ICD-10-CM

## 2024-03-25 PROCEDURE — RECHECK

## 2024-03-25 NOTE — PROGRESS NOTES
Patient's name and  were verified during visit.  Provider explained that [employer] is a HIPPA compliant platform and to further protect their confidentiality the provider was alone and the office door was closed.  Patient consented to the virtual video visit. This visit is free.    Patient presents for 1 hour Behavioral Health Evaluation as a part of Medical Weight Management Program.    Eating behaviors/food choices: Reports history of fad diets, trying various ways to lose weight with little to no significant impact. She is working on having three meals a day and has switched out types of snack foods to be lower in calories, sugars and carbs. Patient tried to track using MyFitness Pal but got confused as to what the calorie range should be for her. Suggested patient consider connecting to RD to discuss her specific dietary needs, calorie range and nutrients can get discussed and range given. Suggested she just write down the foods she eats so she can discuss with bariatrician and/or RD for guidance.     Activity/Exercise:  Patient goes to the gym, has always been pretty active with sports when she was younger, mostly exercises for the weight loss benefits. She gets discouraged if she doesn't lose weight in response, says she doesn't get much pleasure from being active. Reviewed the multiple health benefits to being active, the impact on metabolism which can support with loss. Suggested patient reflect on the benefits of being active outside of the desire to lose weight, the ways the body can change and be healthy with movement.    Sleep/Rest:  Patient denies any issues with sleep, she is able to fall asleep without issue, sleeps throughout the night and only feels fatigue if she stayed up too late the night before. She was trying to get up early to go to the gym and then come back to nap without much success, informed patient of the stimulating effect exercise can have. Suggested she get a full night's sleep  "and then start her day with exercise instead of trying to break it up.    Mental Health/Wellness:  Patient reported concerns to bariatrician about disordered eating. Reviewed history of behaviors, she would nibble and pick on various foods, could eat half a bag of chips or a cookies in a sitting and mindlessly eat while watching TV. Patient did not report symptoms relevant to Binge Eating Disorder, doesn't feel a sense of being out of control when she eats. She did have four episodes over a two month period of binging and purging several years ago. She said it was in response to eating a large meal and wanting to make up for it by getting rid of it, she has not participated in those behaviors since. Denies an sever restriction, has not engaged in other compensatory behaviors. Psychoeducation provided about eating disorders, encouraged patient to be mindful of return of binging purging on a more regular basis or eating volume increasing. She does use recreational marijuana once per week, denies appetite goes up much more but her inhibitions and mindfulness go down so more likely to snack. She reports symptoms of anxiety and depression,she uses fidgets and journaling to try to manage. She has considered therapy but not sure how it could really help her when she knows what her issues are, also worried others will think \"there's something wrong\" with her. Discussed the benefits of therapy, the guidance and challenging of cognitions that can be provided in a safe environment to manage her self care. Patient said she would consider it, she would talk with her mom about help to get connected.    Next Appointment:  with Dr. Foreman on 6/11  "

## 2024-03-27 ENCOUNTER — TELEPHONE (OUTPATIENT)
Age: 24
End: 2024-03-27

## 2024-03-27 NOTE — TELEPHONE ENCOUNTER
Pt was calling inquiring about getting some information about getting started to seeing a mental health therapist. Please advise with the pt with details about therapist thank you.

## 2024-04-10 ENCOUNTER — RA CDI HCC (OUTPATIENT)
Dept: OTHER | Facility: HOSPITAL | Age: 24
End: 2024-04-10

## 2024-04-10 PROBLEM — E66.1 CLASS 3 DRUG-INDUCED OBESITY WITH BODY MASS INDEX (BMI) OF 40.0 TO 44.9 IN ADULT (HCC): Status: ACTIVE | Noted: 2024-01-10

## 2024-04-10 PROBLEM — E66.813 CLASS 3 DRUG-INDUCED OBESITY WITH BODY MASS INDEX (BMI) OF 40.0 TO 44.9 IN ADULT (HCC): Status: ACTIVE | Noted: 2024-01-10

## 2024-04-10 NOTE — PROGRESS NOTES
HCC coding opportunities       Chart reviewed, no opportunity found: CHART REVIEWED, NO OPPORTUNITY FOUND        Patients Insurance        Commercial Insurance: [employer]

## 2024-04-17 ENCOUNTER — OFFICE VISIT (OUTPATIENT)
Dept: FAMILY MEDICINE CLINIC | Facility: CLINIC | Age: 24
End: 2024-04-17
Payer: COMMERCIAL

## 2024-04-17 VITALS
DIASTOLIC BLOOD PRESSURE: 80 MMHG | WEIGHT: 220 LBS | TEMPERATURE: 97.9 F | HEART RATE: 76 BPM | SYSTOLIC BLOOD PRESSURE: 120 MMHG | OXYGEN SATURATION: 98 % | BODY MASS INDEX: 40.48 KG/M2 | RESPIRATION RATE: 14 BRPM | HEIGHT: 62 IN

## 2024-04-17 DIAGNOSIS — F41.9 ANXIETY AND DEPRESSION: ICD-10-CM

## 2024-04-17 DIAGNOSIS — E66.1 CLASS 3 DRUG-INDUCED OBESITY WITH BODY MASS INDEX (BMI) OF 40.0 TO 44.9 IN ADULT, UNSPECIFIED WHETHER SERIOUS COMORBIDITY PRESENT (HCC): Primary | ICD-10-CM

## 2024-04-17 DIAGNOSIS — F32.A ANXIETY AND DEPRESSION: ICD-10-CM

## 2024-04-17 DIAGNOSIS — J45.20 MILD INTERMITTENT ASTHMA WITHOUT COMPLICATION: ICD-10-CM

## 2024-04-17 PROCEDURE — 99213 OFFICE O/P EST LOW 20 MIN: CPT | Performed by: FAMILY MEDICINE

## 2024-04-17 NOTE — PROGRESS NOTES
"Assessment/Plan:    1. Class 3 drug-induced obesity with body mass index (BMI) of 40.0 to 44.9 in adult, unspecified whether serious comorbidity present (HCC)  Comments:  cont follow-up w Bariatric ctr; lifestyle modifications  t/c wellbutrin use(d/w pt component of Contrave)    2. Anxiety and depression  Comments:  t/c Wellbutrin  ref for counseling    3. Mild intermittent asthma without complication  Comments:  cont w rxs daily Singulair and prn Albuterol       Subjective:      Patient ID: Trice Wong is a 24 y.o. female.    Chief Complaint   Patient presents with   • Mental Health Problem     Discuss mental health therapy       HPI    Follow-up  Interval hx reviewed  +7 lb weight loss following bariatric consult/lifestyle modifications  BP in range  As doing well without would like to hold on rx for wgt loss  Ongoing stress/anxiety--Remains interested in starting therapy    The following portions of the patient's history were reviewed and updated as appropriate: allergies, current medications, past family history, past medical history, past social history, past surgical history and problem list.    Review of Systems   Constitutional:  Positive for fatigue.   Respiratory: Negative.     Cardiovascular: Negative.    Psychiatric/Behavioral:  Positive for decreased concentration and sleep disturbance. Negative for hallucinations, self-injury and suicidal ideas. The patient is nervous/anxious.          Current Outpatient Medications   Medication Sig Dispense Refill   • albuterol (ProAir HFA) 90 mcg/act inhaler ProAir HFA     • montelukast (SINGULAIR) 10 mg tablet Take 1 tablet (10 mg total) by mouth daily at bedtime 90 tablet 3     No current facility-administered medications for this visit.       Objective:    /80 (BP Location: Left arm, Patient Position: Sitting, Cuff Size: Adult)   Pulse 76   Temp 97.9 °F (36.6 °C) (Temporal)   Resp 14   Ht 5' 2\" (1.575 m)   Wt 99.8 kg (220 lb)   SpO2 98%   BMI 40.24 " kg/m²        Physical Exam  Vitals and nursing note reviewed.   Constitutional:       General: She is not in acute distress.     Appearance: She is obese.   Cardiovascular:      Rate and Rhythm: Normal rate and regular rhythm.   Pulmonary:      Effort: Pulmonary effort is normal.      Breath sounds: Normal breath sounds.   Musculoskeletal:      Cervical back: Neck supple. No tenderness.   Skin:     General: Skin is warm and dry.   Neurological:      Mental Status: She is alert and oriented to person, place, and time.   Psychiatric:         Mood and Affect: Mood normal.              Sun Carpenter MD

## 2024-04-23 ENCOUNTER — TELEPHONE (OUTPATIENT)
Age: 24
End: 2024-04-23

## 2024-04-23 NOTE — TELEPHONE ENCOUNTER
Patient had visit with Dr. Carpenter and was told that someone from TONNY Juan's office would reach out to her to set up an appt. As of this date, she has heard from no one.  I did give her a # of [phone].  Patient wanted to let Dr. Carpenter know that she had NOT been contact by Nilay's office.

## 2024-06-11 ENCOUNTER — OFFICE VISIT (OUTPATIENT)
Dept: BARIATRICS | Facility: CLINIC | Age: 24
End: 2024-06-11
Payer: COMMERCIAL

## 2024-06-11 ENCOUNTER — TELEPHONE (OUTPATIENT)
Age: 24
End: 2024-06-11

## 2024-06-11 VITALS
BODY MASS INDEX: 38.02 KG/M2 | DIASTOLIC BLOOD PRESSURE: 80 MMHG | WEIGHT: 206.6 LBS | HEIGHT: 62 IN | HEART RATE: 60 BPM | SYSTOLIC BLOOD PRESSURE: 120 MMHG

## 2024-06-11 DIAGNOSIS — E66.01 OBESITY, CLASS III, BMI 40-49.9 (MORBID OBESITY) (HCC): Primary | ICD-10-CM

## 2024-06-11 PROBLEM — E66.813 OBESITY, CLASS III, BMI 40-49.9 (MORBID OBESITY): Status: ACTIVE | Noted: 2024-06-11

## 2024-06-11 PROCEDURE — 99205 OFFICE O/P NEW HI 60 MIN: CPT | Performed by: INTERNAL MEDICINE

## 2024-06-11 NOTE — TELEPHONE ENCOUNTER
Patient called to confirm office will be open for her appointment at 5:30 on 6/11/24. Advised patient of office hours.

## 2024-06-11 NOTE — ASSESSMENT & PLAN NOTE
Nutrition:      Physical Activity:     Sleep: -    Behavioral/Stress: Start tracking current intake using MFP, so adjustments can be made by the dietitian.    Antiobesity Medications/Medical --

## 2024-06-11 NOTE — PROGRESS NOTES
Program: [facility]    Assessment/Plan     Trice Wong  is a 24 y.o. female with  returns to follow up  for treatment of excess body weight and associated risk factors/co-morbidities.     Obesity, Class III, BMI 40-49.9 (morbid obesity) (HCC)  Nutrition: Congratulated patient on the 21 pound weight loss just with making lifestyle changes by cutting out liquid calories from cutting down Gatorade avoiding fried food incorporating more vegetables.  Patient is currently not tracking her calories.  Did present her with the option of visiting with the dietitian for calorie target and protein range to help potentiate her weight loss.  Patient would like to defer this at the moment.  -Avoid skipping meals  -Incorporate protein in with each meal and snacks in between meals  -  Calorie goal; protein goal  Physical Activity: Continue excellent physical activity routine in the gym; patient reports that she is looking into joining of softball league as she has played basketball and softball during high school but stopped during COVID    Sleep -- STOP- BANG- 2/8 7-8 hours    Behavioral/Stress: Patient could consider start tracking current intake using MFP,   -Advise cutting back on marijuana and intake to help her weight loss efforts; however patient feels that she has good control of her hunger  -Patient also met with the behaviorist and was advised to follow-up with a therapist.  Patient states that she feels very good physically and mentally currently and does not have the need to follow-up with 1  -    Antiobesity Medications/Medical --patient is very pleased with her progress and would like to continue with lifestyle efforts.  She reports she is concerned about a plateau and will consider medications at that point.  Patient is currently not on any contraception and is aware that she would need to initiate it which she start injectable medicines or oral.  Will follow-up with patient in 3 months and decide based  "on weight trajectory      Most recent notes and records were reviewed.         Return visit:  2-3 months     Nutrition   Do not skip meals. Avoid sugary beverages. At least 64oz of water daily.    Behavioral/Stress   Food log via radha or provided paper log (radha options include www.Serstechpal.com, sparkpeople.com, loseit.com, calorieking.com, "[employer] Encouraged mindful eating    Physical Activity  Increase physical activity by 10 minutes daily. Gradually increase physical activity to a goal of 5 days per week for 30 minutes of MODERATE intensity ( ( should be able to pass the \"talk test\" but should not be able to sing.  target 150-300 minutes  PLUS 2-3 days per week of FULL BODY resistance training. Progression will be addressed at follow up visits. Encouraged planning regarding establishing physical activity routine    Sleep  Provided sleep hygiene counseling and importance of having adequate sleep duration          Trice was seen today for follow-up.    Diagnoses and all orders for this visit:    Obesity, Class III, BMI 40-49.9 (morbid obesity) (HCC)                Total time spent reviewing chart, interviewing patient, examining patient, discussing plan, answering all questions, and documentin minutes with >50% face-to-face time with the patient.            Weight trajectory     Wt Readings from Last 20 Encounters:   24 93.7 kg (206 lb 9.6 oz)   24 99.8 kg (220 lb)   24 103 kg (227 lb 6.4 oz)   01/10/24 103 kg (226 lb)   22 84.4 kg (186 lb)               Weight/ Lifestyle history/HPI     Patient reports     Has met with behaviorist-Per note she denied any symptoms currently of binge eating disorder or compensatory behaviors such as purging.  She was suggested therapy  Was referred by PCP to therapy unfortunately was not able to make an appointment  Diet recall:  B: depends, 10:15 AM Ham and cheese sandwich with doritos,   L: beliaps  D: Meat loaf baking and air frying and " "veggies, no white rice   S: popcorn, sometimes eats late into the night  Eating out vs cooking at home- 2-3 x/ week     Beverages  Water--  16x3   Caffeine/tea--  none   SSB-cut out Gatorade completely regular gatorde 2x/ day, no sodas     Alcohol: rarely  Smoking: marijunana 2-3 x/ week(control of   Drug use: marijuana     Physical Activity --gym Monday through Friday for an hour stairmaster for 10 mins , crunches   Sleep -- STOP- BANG- 2/8 7-8 hours     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  Psycho social- BF and her    Gyneac (Menopausal status/periods/contraception)- none    Start date: 03/11/24  Current weight : 227 lbs  Current BMI: 41.59 kg/m2  Obesity Class: Above 40- Obesity Class III  Goal weight: 170-180 lbs    Current Weight on 6/11/2024: 206 lbs  Current BMI : 37.7 kg/m2 35.0-39.9- Obesity Class II  Difference: -21 lbs      Anti obesity Medications/ Medical---none                  Objective         The following portions of the patient's history were reviewed and updated as appropriate: allergies, current medications, past family history, past medical history, past social history, past surgical history, and problem list.      /80 (BP Location: Left arm, Patient Position: Sitting, Cuff Size: Large)   Pulse 60   Ht 5' 2\" (1.575 m)   Wt 93.7 kg (206 lb 9.6 oz)   LMP 05/24/2024 (Exact Date)   BMI 37.79 kg/m²             Review of Systems   Constitutional:  Negative for fatigue.   HENT:  Negative for sore throat.    Respiratory:  Negative for cough and shortness of breath.    Cardiovascular:  Negative for chest pain, palpitations and leg swelling.   Gastrointestinal:  Negative for abdominal pain, constipation, diarrhea and nausea.   Genitourinary:  Negative for dysuria.   Musculoskeletal:  Negative for arthralgias and back pain.   Skin:  Negative for rash.   Neurological:  Negative for headaches.   Psychiatric/Behavioral:  Negative for dysphoric mood. The patient is not nervous/anxious.        " "  Physical Exam  Vitals and nursing note reviewed.   Constitutional:       Appearance: Normal appearance.   HENT:      Head: Normocephalic.   Pulmonary:      Effort: Pulmonary effort is normal.   Neurological:      General: No focal deficit present.      Mental Status: She is alert and oriented to person, place, and time.   Psychiatric:         Mood and Affect: Mood normal.         Behavior: Behavior normal.         Thought Content: Thought content normal.         Judgment: Judgment normal.          Current medications       Current Outpatient Medications:     albuterol (ProAir HFA) 90 mcg/act inhaler, ProAir HFA, Disp: , Rfl:     montelukast (SINGULAIR) 10 mg tablet, Take 1 tablet (10 mg total) by mouth daily at bedtime, Disp: 90 tablet, Rfl: 3         Labs     Most recent labs reviewed   Lab Results   Component Value Date    SODIUM 142 01/10/2024    K 4.5 01/10/2024     01/10/2024    CO2 22 01/10/2024    BUN 11 01/10/2024    CREATININE 0.68 01/10/2024    GLUC 97 01/10/2024    AST 23 01/10/2024    ALT 32 01/10/2024    TP 6.9 01/10/2024    TBILI 0.3 01/10/2024    EGFR 127 01/10/2024     Lab Results   Component Value Date    HGBA1C 5.5 01/10/2024     Lab Results   Component Value Date    TSH 1.190 01/10/2024     Lab Results   Component Value Date    CHOLESTEROL 161 01/10/2024     Lab Results   Component Value Date    HDL 35 (L) 01/10/2024     Lab Results   Component Value Date    TRIG 212 (H) 01/10/2024     Lab Results   Component Value Date    LDLCALC 90 01/10/2024     No results found for: \"VITD\"  No components found for: \"FASTINS\"   "

## 2024-06-11 NOTE — ASSESSMENT & PLAN NOTE
Nutrition: Congratulated patient on the 21 pound weight loss just with making lifestyle changes by cutting out liquid calories from cutting down Gatorade avoiding fried food incorporating more vegetables.  Patient is currently not tracking her calories.  Did present her with the option of visiting with the dietitian for calorie target and protein range to help potentiate her weight loss.  Patient would like to defer this at the moment.  -Avoid skipping meals  -Incorporate protein in with each meal and snacks in between meals  -  Calorie goal; protein goal  Physical Activity: Continue excellent physical activity routine in the gym; patient reports that she is looking into joining of softball league as she has played basketball and softball during high school but stopped during COVID    Sleep -- STOP- BANG- 2/8 7-8 hours    Behavioral/Stress: Patient could consider start tracking current intake using MFP,   -Advise cutting back on marijuana and intake to help her weight loss efforts; however patient feels that she has good control of her hunger  -Patient also met with the behaviorist and was advised to follow-up with a therapist.  Patient states that she feels very good physically and mentally currently and does not have the need to follow-up with 1  -    Antiobesity Medications/Medical --patient is very pleased with her progress and would like to continue with lifestyle efforts.  She reports she is concerned about a plateau and will consider medications at that point.  Patient is currently not on any contraception and is aware that she would need to initiate it which she start injectable medicines or oral.  Will follow-up with patient in 3 months and decide based on weight trajectory

## 2024-11-11 DIAGNOSIS — J45.20 MILD INTERMITTENT ASTHMA WITHOUT COMPLICATION: Primary | ICD-10-CM

## 2024-11-11 DIAGNOSIS — J45.20 MILD INTERMITTENT ASTHMA WITHOUT COMPLICATION: ICD-10-CM

## 2024-11-12 RX ORDER — MONTELUKAST SODIUM 10 MG/1
10 TABLET ORAL
Qty: 90 TABLET | Refills: 1 | Status: SHIPPED | OUTPATIENT
Start: 2024-11-12

## 2024-11-14 RX ORDER — ALBUTEROL SULFATE 90 UG/1
2 INHALANT RESPIRATORY (INHALATION) EVERY 6 HOURS PRN
Qty: 81 G | Refills: 1 | Status: SHIPPED | OUTPATIENT
Start: 2024-11-14

## 2024-11-14 NOTE — TELEPHONE ENCOUNTER
Pt is scheduled to come in on 11/26 with Dr. Gaytan. If possible please provide pt with courtesy refill.

## 2024-11-26 ENCOUNTER — OFFICE VISIT (OUTPATIENT)
Dept: FAMILY MEDICINE CLINIC | Facility: CLINIC | Age: 24
End: 2024-11-26
Payer: COMMERCIAL

## 2024-11-26 VITALS
DIASTOLIC BLOOD PRESSURE: 88 MMHG | WEIGHT: 207.6 LBS | TEMPERATURE: 98.2 F | HEART RATE: 58 BPM | BODY MASS INDEX: 38.2 KG/M2 | OXYGEN SATURATION: 99 % | RESPIRATION RATE: 16 BRPM | SYSTOLIC BLOOD PRESSURE: 132 MMHG | HEIGHT: 62 IN

## 2024-11-26 DIAGNOSIS — E87.8 ELECTROLYTE ABNORMALITY: ICD-10-CM

## 2024-11-26 DIAGNOSIS — F41.9 ANXIETY AND DEPRESSION: ICD-10-CM

## 2024-11-26 DIAGNOSIS — Z13.0 SCREENING, IRON DEFICIENCY ANEMIA: ICD-10-CM

## 2024-11-26 DIAGNOSIS — J45.20 MILD INTERMITTENT ASTHMA WITHOUT COMPLICATION: Primary | ICD-10-CM

## 2024-11-26 DIAGNOSIS — Z13.29 THYROID DISORDER SCREENING: ICD-10-CM

## 2024-11-26 DIAGNOSIS — E34.9 HORMONE IMBALANCE: ICD-10-CM

## 2024-11-26 DIAGNOSIS — F32.A ANXIETY AND DEPRESSION: ICD-10-CM

## 2024-11-26 PROBLEM — R73.09 ABNORMAL GLUCOSE: Status: RESOLVED | Noted: 2024-01-10 | Resolved: 2024-11-26

## 2024-11-26 PROBLEM — E66.813 OBESITY, CLASS III, BMI 40-49.9 (MORBID OBESITY): Status: RESOLVED | Noted: 2024-06-11 | Resolved: 2024-11-26

## 2024-11-26 PROBLEM — E66.1 CLASS 3 DRUG-INDUCED OBESITY WITH BODY MASS INDEX (BMI) OF 40.0 TO 44.9 IN ADULT (HCC): Status: RESOLVED | Noted: 2024-01-10 | Resolved: 2024-11-26

## 2024-11-26 PROBLEM — E66.813 CLASS 3 DRUG-INDUCED OBESITY WITH BODY MASS INDEX (BMI) OF 40.0 TO 44.9 IN ADULT (HCC): Status: RESOLVED | Noted: 2024-01-10 | Resolved: 2024-11-26

## 2024-11-26 PROBLEM — E66.01 OBESITY, CLASS III, BMI 40-49.9 (MORBID OBESITY) (HCC): Status: RESOLVED | Noted: 2024-06-11 | Resolved: 2024-11-26

## 2024-11-26 PROCEDURE — 99214 OFFICE O/P EST MOD 30 MIN: CPT | Performed by: STUDENT IN AN ORGANIZED HEALTH CARE EDUCATION/TRAINING PROGRAM

## 2024-11-26 RX ORDER — ALBUTEROL SULFATE 90 UG/1
2 INHALANT RESPIRATORY (INHALATION) EVERY 6 HOURS PRN
Qty: 18 G | Refills: 5 | Status: SHIPPED | OUTPATIENT
Start: 2024-11-26

## 2024-11-26 NOTE — PROGRESS NOTES
Clinic Visit Note  Trice Wong 24 y.o. female   MRN: [id number]    Assessment and Plan      Diagnoses and all orders for this visit:    Mild intermittent asthma without complication  -     albuterol (ProAir HFA) 90 mcg/act inhaler; Inhale 2 puffs every 6 (six) hours as needed for wheezing    Anxiety and depression    Hormone imbalance  -     Prolactin; Future  -     Estrogens, total; Future  -     Testosterone, free, total; Future  -     Luteinizing hormone; Future  -     Follicle stimulating hormone; Future  -     Prolactin  -     Estrogens, total  -     Testosterone, free, total  -     Luteinizing hormone  -     Follicle stimulating hormone    Screening, iron deficiency anemia  -     CBC and differential; Future  -     CBC and differential    Electrolyte abnormality  -     Comprehensive metabolic panel; Future  -     Comprehensive metabolic panel    Thyroid disorder screening  -     T4, free; Future  -     TSH, 3rd generation; Future  -     T4, free  -     TSH, 3rd generation      Patient is a 24-year-old female, albuterol inhaler refilled, lungs clear to auscultation bilaterally, no acute concerns.    Questions on fertility, hormone imbalance, recommend blood work follow-up, recommend specialty evaluation afterwards.    My impressions and treatment recommendations were discussed in detail with the patient who verbalized understanding and had no further questions.  Discharge instructions were provided.    Subjective     Chief Complaint: Follow-up visit    History of Present Illness:    Patient is a 24-year-old female coming in for follow-up, refill medications, hormone imbalance questions.    The following portions of the patient's history were reviewed and updated as appropriate: allergies, current medications, past family history, past medical history, past social history, past surgical history and problem list.    REVIEW OF SYSTEMS:  A complete 12-point review of systems is negative other than that noted in  the HPI.    Review of Systems   Constitutional:  Negative for chills and fever.   HENT:  Negative for ear pain and sore throat.    Eyes:  Negative for pain and visual disturbance.   Respiratory:  Negative for cough and shortness of breath.    Cardiovascular:  Negative for chest pain and palpitations.   Gastrointestinal:  Negative for abdominal pain and vomiting.   Genitourinary:  Negative for dysuria and hematuria.   Musculoskeletal:  Negative for arthralgias and back pain.   Skin:  Negative for color change and rash.   Neurological:  Negative for seizures and syncope.   All other systems reviewed and are negative.        Current Outpatient Medications:     albuterol (ProAir HFA) 90 mcg/act inhaler, Inhale 2 puffs every 6 (six) hours as needed for wheezing, Disp: 18 g, Rfl: 5    montelukast (SINGULAIR) 10 mg tablet, TAKE 1 TABLET BY MOUTH DAILY AT BEDTIME, Disp: 90 tablet, Rfl: 1  Past Medical History:   Diagnosis Date    Anxiety     Never diagnosed    Asthma     Depression     Never diagnosed    Dermographism      Past Surgical History:   Procedure Laterality Date    NO PAST SURGERIES       Social History     Socioeconomic History    Marital status: Single     Spouse name: Not on file    Number of children: Not on file    Years of education: Not on file    Highest education level: Not on file   Occupational History    Not on file   Tobacco Use    Smoking status: Never    Smokeless tobacco: Never   Vaping Use    Vaping status: Never Used   Substance and Sexual Activity    Alcohol use: Never    Drug use: Never    Sexual activity: Yes     Partners: Male     Birth control/protection: None   Other Topics Concern    Not on file   Social History Narrative    Not on file     Social Drivers of Health     Financial Resource Strain: Not on file   Food Insecurity: Not on file   Transportation Needs: Not on file   Physical Activity: Not on file   Stress: Not on file   Social Connections: Not on file   Intimate Partner Violence:  "Not on file   Housing Stability: Not on file     Family History   Problem Relation Age of Onset    Diabetes Father     No Known Problems Brother     Alcohol abuse Maternal Grandfather     Dementia Maternal Grandfather     Cancer Maternal Grandmother     Asthma Cousin      Allergies   Allergen Reactions    Dog Epithelium Wheezing    Seasonal Ic [Octacosanol] Wheezing       Objective     Vitals:    11/26/24 1726   BP: 132/88   BP Location: Left arm   Patient Position: Sitting   Cuff Size: Standard   Pulse: 58   Resp: 16   Temp: 98.2 °F (36.8 °C)   TempSrc: Temporal   SpO2: 99%   Weight: 94.2 kg (207 lb 9.6 oz)   Height: 5' 2\" (1.575 m)       Physical Exam:     GENERAL: NAD, pleasant   HEENT:  NC/AT, PERRL, EOMI, no scleral icterus  CARDIAC:  RRR, +S1/S2, no S3/S4 appreciated, no m/g/r  PULMONARY:  CTA B/L, no wheezing/rales/rhonci, non-labored breathing  ABDOMEN:  Soft, NT/ND, no rebound/guarding/rigidity  Extremities:. No edema, cyanosis, or clubbing  Musculoskeletal:  Full range of motion intact in all extremities   NEUROLOGIC: Grossly intact, no focal deficits  SKIN:  No rashes or erythema noted on exposed skin  Psych: Normal affect, mood stable    ==  PLEASE NOTE:  This encounter was completed utilizing the [employer] Direct Speech Voice Recognition Software. Grammatical errors, random word insertions, pronoun errors and incomplete sentences are occasional consequences of the system due to software limitations, ambient noise and hardware issues.These may be missed by proof reading prior to affixing electronic signature. Any questions or concerns about the content, text or information contained within the body of this dictation should be directly addressed to the physician for clarification. Please do not hesitate to call me directly if you have any any questions or concerns.    Walter Gaytan, DO  [facility]   [facility]     "

## 2025-06-05 ENCOUNTER — TELEPHONE (OUTPATIENT)
Age: 25
End: 2025-06-05

## 2025-06-05 NOTE — TELEPHONE ENCOUNTER
Patient called and stated she is interested in the Zepbound weight loss injections.Patient stated that her healthcare insurance covers Zepbound. Please advise.

## 2025-06-23 ENCOUNTER — OFFICE VISIT (OUTPATIENT)
Dept: FAMILY MEDICINE CLINIC | Facility: CLINIC | Age: 25
End: 2025-06-23
Payer: COMMERCIAL

## 2025-06-23 VITALS
SYSTOLIC BLOOD PRESSURE: 130 MMHG | WEIGHT: 210 LBS | RESPIRATION RATE: 14 BRPM | HEART RATE: 60 BPM | BODY MASS INDEX: 38.64 KG/M2 | HEIGHT: 62 IN | DIASTOLIC BLOOD PRESSURE: 84 MMHG | TEMPERATURE: 97.2 F | OXYGEN SATURATION: 98 %

## 2025-06-23 DIAGNOSIS — E66.09 CLASS 2 OBESITY DUE TO EXCESS CALORIES WITH BODY MASS INDEX (BMI) OF 38.0 TO 38.9 IN ADULT, UNSPECIFIED WHETHER SERIOUS COMORBIDITY PRESENT: Primary | ICD-10-CM

## 2025-06-23 DIAGNOSIS — E66.812 CLASS 2 OBESITY DUE TO EXCESS CALORIES WITH BODY MASS INDEX (BMI) OF 38.0 TO 38.9 IN ADULT, UNSPECIFIED WHETHER SERIOUS COMORBIDITY PRESENT: Primary | ICD-10-CM

## 2025-06-23 DIAGNOSIS — J45.20 MILD INTERMITTENT ASTHMA WITHOUT COMPLICATION: ICD-10-CM

## 2025-06-23 PROCEDURE — 99213 OFFICE O/P EST LOW 20 MIN: CPT | Performed by: FAMILY MEDICINE

## 2025-06-23 RX ORDER — TIRZEPATIDE 2.5 MG/.5ML
2.5 INJECTION, SOLUTION SUBCUTANEOUS WEEKLY
Qty: 2 ML | Refills: 0 | Status: SHIPPED | OUTPATIENT
Start: 2025-06-23 | End: 2025-07-21

## 2025-06-23 NOTE — PROGRESS NOTES
"Name: Trice Wong      : 2000      MRN: [id number]  Encounter Provider: Sun Carpenter MD  Encounter Date: 2025   Encounter department: [facility]  :  Assessment & Plan  Class 2 obesity due to excess calories with body mass index (BMI) of 38.0 to 38.9 in adult, unspecified whether serious comorbidity present    Form completed  Orders:  •  tirzepatide (Zepbound) 2.5 mg/0.5 mL auto-injector; Inject 0.5 mL (2.5 mg total) under the skin once a week for 28 days    Mild intermittent asthma without complication  Daily Singulair  PRN Albuterol inh as per instruction              History of Present Illness   HPI    Follow-up on wgt mgt  States GLP-1 agonists now covered under her plan  Requests rx as various attempts at wgt loss thru lifestyle modifications in diet/exercise have been unsuccessful  Past labs showed TSH  & Hga1c wnl;   TG above range and HDL below      Review of Systems   Constitutional:  Positive for fatigue.   Respiratory: Negative.     Cardiovascular: Negative.    Psychiatric/Behavioral:  Positive for decreased concentration and sleep disturbance. Negative for hallucinations, self-injury and suicidal ideas. The patient is nervous/anxious.        Objective   /84   Pulse 60   Temp (!) 97.2 °F (36.2 °C) (Temporal)   Resp 14   Ht 5' 2\" (1.575 m)   Wt 95.3 kg (210 lb)   SpO2 98%   BMI 38.41 kg/m²      Physical Exam  Vitals and nursing note reviewed.   Constitutional:       General: She is not in acute distress.     Appearance: She is obese.     Cardiovascular:      Rate and Rhythm: Normal rate and regular rhythm.   Pulmonary:      Effort: Pulmonary effort is normal. No respiratory distress.      Breath sounds: Normal breath sounds.   Abdominal:      Palpations: Abdomen is soft.      Tenderness: There is no abdominal tenderness.     Musculoskeletal:      Cervical back: Neck supple. No tenderness.     Skin:     General: Skin is warm and dry.      Coloration: " Skin is not jaundiced.     Neurological:      General: No focal deficit present.      Mental Status: She is alert and oriented to person, place, and time.      Cranial Nerves: No cranial nerve deficit.     Psychiatric:         Mood and Affect: Mood normal.

## 2025-07-24 DIAGNOSIS — J45.20 MILD INTERMITTENT ASTHMA WITHOUT COMPLICATION: ICD-10-CM

## 2025-07-28 RX ORDER — ALBUTEROL SULFATE 90 UG/1
2 INHALANT RESPIRATORY (INHALATION) EVERY 6 HOURS PRN
Qty: 18 G | Refills: 1 | Status: SHIPPED | OUTPATIENT
Start: 2025-07-28